# Patient Record
Sex: MALE | Race: WHITE | NOT HISPANIC OR LATINO | URBAN - METROPOLITAN AREA
[De-identification: names, ages, dates, MRNs, and addresses within clinical notes are randomized per-mention and may not be internally consistent; named-entity substitution may affect disease eponyms.]

---

## 2017-01-04 ENCOUNTER — FOLLOW UP ESTABLISHED (OUTPATIENT)
Dept: URBAN - METROPOLITAN AREA CLINIC 24 | Facility: CLINIC | Age: 72
End: 2017-01-04
Payer: COMMERCIAL

## 2017-01-04 DIAGNOSIS — Z96.1 PRESENCE OF INTRAOCULAR LENS: ICD-10-CM

## 2017-01-04 DIAGNOSIS — H25.12 AGE-RELATED NUCLEAR CATARACT, LEFT EYE: ICD-10-CM

## 2017-01-04 PROCEDURE — 92012 INTRM OPH EXAM EST PATIENT: CPT | Performed by: OPTOMETRIST

## 2017-01-04 PROCEDURE — 92133 CPTRZD OPH DX IMG PST SGM ON: CPT | Performed by: OPTOMETRIST

## 2017-01-04 ASSESSMENT — INTRAOCULAR PRESSURE
OS: 20
OD: 27
OD: 32

## 2017-01-25 ENCOUNTER — FOLLOW UP ESTABLISHED (OUTPATIENT)
Dept: URBAN - METROPOLITAN AREA CLINIC 24 | Facility: CLINIC | Age: 72
End: 2017-01-25
Payer: COMMERCIAL

## 2017-01-25 DIAGNOSIS — H04.123 DRY EYE SYNDROME OF BILATERAL LACRIMAL GLANDS: ICD-10-CM

## 2017-01-25 PROCEDURE — 92012 INTRM OPH EXAM EST PATIENT: CPT | Performed by: OPTOMETRIST

## 2017-01-25 RX ORDER — BRINZOLAMIDE/BRIMONIDINE TARTRATE 10; 2 MG/ML; MG/ML
SUSPENSION/ DROPS OPHTHALMIC
Qty: 3 | Refills: 3 | Status: ACTIVE
Start: 2017-01-25

## 2017-01-25 RX ORDER — BRINZOLAMIDE/BRIMONIDINE TARTRATE 10; 2 MG/ML; MG/ML
SUSPENSION/ DROPS OPHTHALMIC
Qty: 0 | Refills: 0 | Status: INACTIVE
Start: 2017-01-04 | End: 2017-01-25

## 2017-01-25 ASSESSMENT — INTRAOCULAR PRESSURE
OS: 18
OD: 26
OS: 21

## 2017-05-31 ENCOUNTER — FOLLOW UP ESTABLISHED (OUTPATIENT)
Dept: URBAN - METROPOLITAN AREA CLINIC 24 | Facility: CLINIC | Age: 72
End: 2017-05-31
Payer: COMMERCIAL

## 2017-05-31 DIAGNOSIS — H40.053 OCULAR HYPERTENSION, BILATERAL: Primary | ICD-10-CM

## 2017-05-31 PROCEDURE — 92020 GONIOSCOPY: CPT | Performed by: OPHTHALMOLOGY

## 2017-05-31 PROCEDURE — 92014 COMPRE OPH EXAM EST PT 1/>: CPT | Performed by: OPHTHALMOLOGY

## 2017-05-31 ASSESSMENT — INTRAOCULAR PRESSURE
OD: 20
OS: 20

## 2022-11-17 ENCOUNTER — OFFICE VISIT (OUTPATIENT)
Dept: URBAN - METROPOLITAN AREA CLINIC 24 | Facility: CLINIC | Age: 77
End: 2022-11-17
Payer: COMMERCIAL

## 2022-11-17 DIAGNOSIS — H25.12 AGE-RELATED NUCLEAR CATARACT, LEFT EYE: ICD-10-CM

## 2022-11-17 DIAGNOSIS — H26.491 OTHER SECONDARY CATARACT, RIGHT EYE: ICD-10-CM

## 2022-11-17 DIAGNOSIS — Z96.1 PRESENCE OF INTRAOCULAR LENS: Primary | ICD-10-CM

## 2022-11-17 DIAGNOSIS — H40.1113 PRIMARY OPEN-ANGLE GLAUCOMA, SEVERE STAGE, RIGHT EYE: ICD-10-CM

## 2022-11-17 PROCEDURE — 92083 EXTENDED VISUAL FIELD XM: CPT | Performed by: OPTOMETRIST

## 2022-11-17 PROCEDURE — 99204 OFFICE O/P NEW MOD 45 MIN: CPT | Performed by: OPTOMETRIST

## 2022-11-17 PROCEDURE — 92133 CPTRZD OPH DX IMG PST SGM ON: CPT | Performed by: OPTOMETRIST

## 2022-11-17 RX ORDER — BRINZOLAMIDE/BRIMONIDINE TARTRATE 10; 2 MG/ML; MG/ML
SUSPENSION/ DROPS OPHTHALMIC
Qty: 7.5 | Refills: 3 | Status: ACTIVE
Start: 2022-11-17

## 2022-11-17 RX ORDER — LATANOPROST 50 UG/ML
0.005 % SOLUTION OPHTHALMIC
Qty: 7.5 | Refills: 3 | Status: ACTIVE
Start: 2022-11-17

## 2022-11-17 ASSESSMENT — INTRAOCULAR PRESSURE
OD: 39
OD: 31
OS: 22
OS: 17

## 2022-11-17 ASSESSMENT — VISUAL ACUITY
OD: 20/15
OS: 20/20

## 2022-11-17 ASSESSMENT — KERATOMETRY
OS: 41.46
OD: 41.41

## 2022-11-17 NOTE — IMPRESSION/PLAN
Impression: Primary open-angle glaucoma, severe stage, right eye: H40.1113. Plan: SEVERE LOSS OD from previous (pt reports seeing outside OD for last several years. Restart:
1. Simbrinza TID OD 2. Latanoprost QHS OD See 2-3 weeks for IOP check.

## 2022-11-17 NOTE — IMPRESSION/PLAN
Impression: Other secondary cataract, right eye: H26.491. Plan: Opacified capsule not affecting vision. No indication for treatment. Return if decreased vision. Pt advised THIS IS NOT the etiology of blurred vision. GLAUCOMA is limiting OD.

## 2022-11-17 NOTE — IMPRESSION/PLAN
Impression: Age-realated nuclear cataract, left eye Plan: No treatment currently recommended due to level of vision. Patient will monitor vision changes and contact us with any decrease in vision, will re-evaluate cataract on return visit.

## 2022-12-05 ENCOUNTER — OFFICE VISIT (OUTPATIENT)
Dept: URBAN - METROPOLITAN AREA CLINIC 24 | Facility: CLINIC | Age: 77
End: 2022-12-05
Payer: COMMERCIAL

## 2022-12-05 DIAGNOSIS — H40.1113 PRIMARY OPEN-ANGLE GLAUCOMA, RIGHT EYE, SEVERE STAGE: Primary | ICD-10-CM

## 2022-12-05 PROCEDURE — 76514 ECHO EXAM OF EYE THICKNESS: CPT | Performed by: OPTOMETRIST

## 2022-12-05 PROCEDURE — 92250 FUNDUS PHOTOGRAPHY W/I&R: CPT | Performed by: OPTOMETRIST

## 2022-12-05 PROCEDURE — 99214 OFFICE O/P EST MOD 30 MIN: CPT | Performed by: OPTOMETRIST

## 2022-12-05 RX ORDER — DORZOLAMIDE HYDROCHLORIDE AND TIMOLOL MALEATE 20; 5 MG/ML; MG/ML
SOLUTION/ DROPS OPHTHALMIC
Qty: 10 | Refills: 3 | Status: ACTIVE
Start: 2022-12-05

## 2022-12-05 RX ORDER — BRIMONIDINE TARTRATE 2 MG/ML
0.2 % SOLUTION/ DROPS OPHTHALMIC
Qty: 15 | Refills: 3 | Status: ACTIVE
Start: 2022-12-05

## 2022-12-05 ASSESSMENT — INTRAOCULAR PRESSURE
OS: 20
OD: 31

## 2022-12-05 NOTE — IMPRESSION/PLAN
Impression: Primary open-angle glaucoma, severe stage, right eye: H40.1113. --Tmax OD: 39, OS: 22 
--Gonio OD=SS TO CBB OS=SS per [facility]
--Pachs OD: 547, OS: 557
--(-) fohx glc
--(-) sulfa allergy
--(-) pmhx lung disease or heart dz
--Target IOP: mid teens or less Plan: SEVERE LOSS OD from previous. No BDP care May '17---->Nov '22 (pt reports seeing outside OD for last several years). MASSIVE progression of disease from previous. Extensive pt edu. [place], OCTs previously demonstrated; pt advised loss is irreversible. Risk of blindness significant. MUST comply with ongoing gtts and tx!! IOP today 31 / 20 Continue: 1. Latanoprost QHS OD Stop simbrinza (pt request due to cost) Start:
2. Cosopt BID OD 3. Brimonidine TID OD Considering limited IOP improvement w/ simbrinza + latanoprost; schedule f/u with glc team (next avail).   Discussed potential need for additional tx including SLT, MIGs, Sx.

## 2023-02-28 ENCOUNTER — OFFICE VISIT (OUTPATIENT)
Dept: URBAN - METROPOLITAN AREA CLINIC 24 | Facility: CLINIC | Age: 78
End: 2023-02-28
Payer: COMMERCIAL

## 2023-02-28 DIAGNOSIS — H40.053 OCULAR HYPERTENSION, BILATERAL: Primary | ICD-10-CM

## 2023-02-28 PROCEDURE — 92020 GONIOSCOPY: CPT | Performed by: OPHTHALMOLOGY

## 2023-02-28 PROCEDURE — 99203 OFFICE O/P NEW LOW 30 MIN: CPT | Performed by: OPHTHALMOLOGY

## 2023-02-28 ASSESSMENT — INTRAOCULAR PRESSURE
OS: 27
OD: 28

## 2023-02-28 NOTE — IMPRESSION/PLAN
Impression: Ocular hypertension, bilateral Plan: PT HAS POAG OU      GONIO OD-SS,  OS=SS (2/28/23)                  PACHS 564//578 THE RIGHT EYE IS PSEUDOPHAKIC, THE LEFT EYE IS PHAKIC S/P PC IOL OD 2012 PT DENIES SULFA ALLERGY
PT DENIES LUNG DZ
TARGET IOP MID TEENS OR LESS
RECOMMEND 1. USE  LATANOPROST OU QPM (STARTED BY DR SANCHEZ)(ADD TO OS 2/28/23) 2. USE COSOPT OU BID (ADD TO OS 2/28/23) 3. USE BRIMONIDINE OU TID (ADD TO OS 2/28/23) 4. VISUAL FIELD 11/17/2022, RNFL 11/17/2022 5. VF ON 11/17/22 SHOWED DRAMATIC GLAUCOMATOUS PROGRESSION COMPARED TO [phone] WHEN HE WAS CARED FOR OUTSIDE [facility] (I HAD NOT SEEN PATIENT FROM 5/31/17 UNTIL TODAY 2/28/23) 6.  CONSULT DR Coni Frost FOR SURGICAL TREATMENT ESPECIALLY OD WITH VF AT THAT TIME

## 2023-05-03 ENCOUNTER — OFFICE VISIT (OUTPATIENT)
Dept: URBAN - METROPOLITAN AREA CLINIC 24 | Facility: CLINIC | Age: 78
End: 2023-05-03
Payer: COMMERCIAL

## 2023-05-03 DIAGNOSIS — H40.1113 PRIMARY OPEN-ANGLE GLAUCOMA, RIGHT EYE, SEVERE STAGE: ICD-10-CM

## 2023-05-03 DIAGNOSIS — H40.052 OCULAR HYPERTENSION, LEFT EYE: ICD-10-CM

## 2023-05-03 DIAGNOSIS — H02.052 TRICHIASIS WITHOUT ENTROPION RIGHT LOWER EYELID: Primary | ICD-10-CM

## 2023-05-03 PROCEDURE — 99214 OFFICE O/P EST MOD 30 MIN: CPT | Performed by: OPHTHALMOLOGY

## 2023-05-03 PROCEDURE — 92020 GONIOSCOPY: CPT | Performed by: OPHTHALMOLOGY

## 2023-05-03 PROCEDURE — 67820 REVISE EYELASHES: CPT | Performed by: OPHTHALMOLOGY

## 2023-05-03 RX ORDER — LATANOPROST 50 UG/ML
0.005 % SOLUTION OPHTHALMIC
Qty: 15 | Refills: 3 | Status: ACTIVE
Start: 2023-05-03

## 2023-05-03 RX ORDER — LATANOPROST 50 UG/ML
0.005 % SOLUTION OPHTHALMIC
Qty: 15 | Refills: 3 | Status: INACTIVE
Start: 2023-05-03 | End: 2023-05-03

## 2023-05-03 RX ORDER — DORZOLAMIDE HYDROCHLORIDE AND TIMOLOL MALEATE 20; 5 MG/ML; MG/ML
SOLUTION/ DROPS OPHTHALMIC
Qty: 10 | Refills: 3 | Status: INACTIVE
Start: 2023-05-03 | End: 2023-05-03

## 2023-05-03 RX ORDER — DORZOLAMIDE HYDROCHLORIDE AND TIMOLOL MALEATE 20; 5 MG/ML; MG/ML
SOLUTION/ DROPS OPHTHALMIC
Qty: 10 | Refills: 3 | Status: ACTIVE
Start: 2023-05-03

## 2023-05-03 ASSESSMENT — INTRAOCULAR PRESSURE
OD: 17
OS: 17

## 2023-05-03 NOTE — IMPRESSION/PLAN
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 Plan: Bilateral lower lid with lashes rubbing K Epilation performed OU without issue.

## 2023-05-03 NOTE — IMPRESSION/PLAN
Impression: Primary open-angle glaucoma, right eye, severe stage: H40.1113. Plan: PT HAS POAG OU      GONIO OD-SS,  OS=SS (2/28/23)     IOP: 17 OU    PACHS 564//578 THE RIGHT EYE IS PSEUDOPHAKIC, THE LEFT EYE IS PHAKIC S/P PC IOL OD 2012 TARGET: MID TO LOW TEENS
PT DENIES SULFA ALLERGY
PT DENIES LUNG DZ
TARGET IOP MID TEENS OR LESS TESTING: 
VF- OD FULL LOSS ; OS STABLE
RECOMMEND 1. USE  LATANOPROST OU QPM, COSOPT BID OU, BRIMONIDINE TID OU. 2. VF ON 11/17/22 SHOWED DRAMATIC GLAUCOMATOUS PROGRESSION COMPARED TO [phone] WHEN HE WAS CARED FOR OUTSIDE [facility] (I HAD NOT SEEN PATIENT FROM 5/31/17 UNTIL TODAY 2/28/23) 3. WILL CONT TO MONITOR. WILL KEEP ON CURRENT DROP REGIMEN 4. RETURN 4-6 WEEKS FOR IOP WITH OPTOM - If IOP still too high OD then return to Dr. [place] for surgical intervention

## 2023-06-14 ENCOUNTER — OFFICE VISIT (OUTPATIENT)
Dept: URBAN - METROPOLITAN AREA CLINIC 24 | Facility: CLINIC | Age: 78
End: 2023-06-14
Payer: COMMERCIAL

## 2023-06-14 DIAGNOSIS — H40.1113 PRIMARY OPEN-ANGLE GLAUCOMA, RIGHT EYE, SEVERE STAGE: Primary | ICD-10-CM

## 2023-06-14 PROCEDURE — 92012 INTRM OPH EXAM EST PATIENT: CPT | Performed by: OPTOMETRIST

## 2023-06-14 RX ORDER — LATANOPROST 50 UG/ML
0.005 % SOLUTION OPHTHALMIC
Qty: 7.5 | Refills: 3 | Status: ACTIVE
Start: 2023-06-14

## 2023-06-14 RX ORDER — BRIMONIDINE TARTRATE 2 MG/ML
0.2 % SOLUTION/ DROPS OPHTHALMIC
Qty: 15 | Refills: 3 | Status: ACTIVE
Start: 2023-06-14

## 2023-06-14 RX ORDER — DORZOLAMIDE HYDROCHLORIDE AND TIMOLOL MALEATE 20; 5 MG/ML; MG/ML
SOLUTION/ DROPS OPHTHALMIC
Qty: 15 | Refills: 3 | Status: ACTIVE
Start: 2023-06-14

## 2023-06-14 ASSESSMENT — INTRAOCULAR PRESSURE
OD: 22
OD: 20
OS: 22

## 2023-06-14 ASSESSMENT — KERATOMETRY
OD: 41.59
OS: 41.47

## 2023-06-14 NOTE — IMPRESSION/PLAN
Impression: Primary open-angle glaucoma, right eye, severe stage: H40.1113. Plan: IOP elevated today despite compliance with gtts. Per Dr. Alberto Campbell previous note: Pt to RTC for surgical consultation. Will schedule within the next 2 weeks. Continue  LATANOPROST OU QPM, COSOPT BID OU, BRIMONIDINE TID OU.

## 2023-06-28 ENCOUNTER — OFFICE VISIT (OUTPATIENT)
Dept: URBAN - METROPOLITAN AREA CLINIC 24 | Facility: CLINIC | Age: 78
End: 2023-06-28
Payer: COMMERCIAL

## 2023-06-28 DIAGNOSIS — H40.052 OCULAR HYPERTENSION, LEFT EYE: ICD-10-CM

## 2023-06-28 DIAGNOSIS — H40.1113 PRIMARY OPEN-ANGLE GLAUCOMA, RIGHT EYE, SEVERE STAGE: Primary | ICD-10-CM

## 2023-06-28 DIAGNOSIS — H25.12 AGE-RELATED NUCLEAR CATARACT, LEFT EYE: ICD-10-CM

## 2023-06-28 PROCEDURE — 99214 OFFICE O/P EST MOD 30 MIN: CPT | Performed by: OPHTHALMOLOGY

## 2023-06-28 RX ORDER — OFLOXACIN 3 MG/ML
0.3 % SOLUTION/ DROPS OPHTHALMIC
Qty: 5 | Refills: 1 | Status: ACTIVE
Start: 2023-06-28

## 2023-06-28 RX ORDER — PREDNISOLONE ACETATE 10 MG/ML
1 % SUSPENSION/ DROPS OPHTHALMIC
Qty: 5 | Refills: 1 | Status: ACTIVE
Start: 2023-06-28

## 2023-06-28 ASSESSMENT — INTRAOCULAR PRESSURE
OS: 10
OD: 17

## 2023-06-28 NOTE — IMPRESSION/PLAN
Impression: Primary open-angle glaucoma, right eye, severe stage: H40.1113. Plan: Pt has Glaucoma    Gonio :   ss o u     Pachs:  547/557    Today's IOP :  17, 10       Tmax  :  39, 27 Target IOP low teens Pt denies Fhx of Glaucoma Equal [facility] OU
HVF 24-2 (5/2023) OD:  global depression OS: full C/D: .95-.95 / .2-.2 OCT: (11/17/22) 60 ; STI thinning // 77 ; wnl Pt denies Sulfa Allergy   // Pt denies Lung /Heart dx Plan :
1. Cont:
Latanoprost OU QPM
Cosopt BID OU Brimonidine TID OU
2. IOP OD is still too elevated for the amount of Glaucoma incurred. Recommend additional surgery to lower IOP. 3.  The Patient is aware that the risks of Xen include but are not limited to complete blindness , loss of vision and loss of the eye, bleeding, infection, inflammation, Hypotony, Persistent IOP elevation, intractable diplopia , and orbital or ocular inflammation. The Patient is aware that there is a 10 % chance of permanent double vision which cannot be remedied by any means including glasses or strabismus surgery. The patient is aware that failure to lower IOP will likely lead to progressive sight loss and possibly blindness . Pt understands and wishes to proceed. *20 minutes/points **PCC's Please notify OR to order device and Mitomycin 4% (allow 1 week for mitomycin to be ordered) Pred Q2H  x 3 days, then QID x 6 weeks then decrease by drop  every 2 weeks 4.  Schedule XEN OD

## 2023-07-25 ENCOUNTER — ADULT PHYSICAL (OUTPATIENT)
Dept: URBAN - METROPOLITAN AREA CLINIC 24 | Facility: CLINIC | Age: 78
End: 2023-07-25
Payer: COMMERCIAL

## 2023-07-25 DIAGNOSIS — Z01.818 ENCOUNTER FOR OTHER PREPROCEDURAL EXAMINATION: Primary | ICD-10-CM

## 2023-07-25 DIAGNOSIS — H40.1113 PRIMARY OPEN-ANGLE GLAUCOMA, RIGHT EYE, SEVERE STAGE: ICD-10-CM

## 2023-07-25 PROCEDURE — 99202 OFFICE O/P NEW SF 15 MIN: CPT | Performed by: PHYSICIAN ASSISTANT

## 2024-03-20 ENCOUNTER — OFFICE VISIT (OUTPATIENT)
Dept: URBAN - METROPOLITAN AREA CLINIC 24 | Facility: CLINIC | Age: 79
End: 2024-03-20
Payer: COMMERCIAL

## 2024-03-20 DIAGNOSIS — H40.052 OCULAR HYPERTENSION, LEFT EYE: ICD-10-CM

## 2024-03-20 DIAGNOSIS — H40.1113 PRIMARY OPEN-ANGLE GLAUCOMA, RIGHT EYE, SEVERE STAGE: Primary | ICD-10-CM

## 2024-03-20 PROCEDURE — 99214 OFFICE O/P EST MOD 30 MIN: CPT | Performed by: OPHTHALMOLOGY

## 2024-03-20 PROCEDURE — 92133 CPTRZD OPH DX IMG PST SGM ON: CPT | Performed by: OPHTHALMOLOGY

## 2024-03-20 PROCEDURE — 92083 EXTENDED VISUAL FIELD XM: CPT | Performed by: OPHTHALMOLOGY

## 2024-03-20 RX ORDER — LATANOPROST 50 UG/ML
0.005 % SOLUTION OPHTHALMIC
Qty: 7.5 | Refills: 3 | Status: ACTIVE
Start: 2024-03-20

## 2024-03-20 RX ORDER — BRIMONIDINE TARTRATE 2 MG/ML
0.2 % SOLUTION/ DROPS OPHTHALMIC
Qty: 15 | Refills: 3 | Status: ACTIVE
Start: 2024-03-20

## 2024-03-20 RX ORDER — DORZOLAMIDE HYDROCHLORIDE AND TIMOLOL MALEATE 20; 5 MG/ML; MG/ML
SOLUTION/ DROPS OPHTHALMIC
Qty: 10 | Refills: 0 | Status: ACTIVE
Start: 2024-03-20

## 2024-03-20 ASSESSMENT — INTRAOCULAR PRESSURE
OD: 30
OS: 18

## 2024-03-20 ASSESSMENT — KERATOMETRY
OD: 41.55
OS: 41.41

## 2024-08-07 ENCOUNTER — OFFICE VISIT (OUTPATIENT)
Dept: URBAN - METROPOLITAN AREA CLINIC 23 | Facility: CLINIC | Age: 79
End: 2024-08-07
Payer: COMMERCIAL

## 2024-08-07 DIAGNOSIS — H02.055 TRICHIASIS WITHOUT ENTROPION LEFT LOWER EYELID: ICD-10-CM

## 2024-08-07 DIAGNOSIS — H40.1134 PRIMARY OPEN-ANGLE GLAUCOMA, BILATERAL, INDETERMINATE STAGE: Primary | ICD-10-CM

## 2024-08-07 PROCEDURE — 76514 ECHO EXAM OF EYE THICKNESS: CPT | Performed by: OPHTHALMOLOGY

## 2024-08-07 PROCEDURE — 92004 COMPRE OPH EXAM NEW PT 1/>: CPT | Performed by: OPHTHALMOLOGY

## 2024-08-07 PROCEDURE — 92020 GONIOSCOPY: CPT | Performed by: OPHTHALMOLOGY

## 2024-08-07 PROCEDURE — 92133 CPTRZD OPH DX IMG PST SGM ON: CPT | Performed by: OPHTHALMOLOGY

## 2024-08-07 RX ORDER — DORZOLAMIDE HYDROCHLORIDE AND TIMOLOL MALEATE 20; 5 MG/ML; MG/ML
SOLUTION/ DROPS OPHTHALMIC
Qty: 10 | Refills: 0 | Status: ACTIVE
Start: 2024-08-07

## 2024-08-07 RX ORDER — BRIMONIDINE TARTRATE 2 MG/ML
0.2 % SOLUTION/ DROPS OPHTHALMIC
Qty: 15 | Refills: 3 | Status: ACTIVE
Start: 2024-08-07

## 2024-08-07 RX ORDER — LATANOPROST 50 UG/ML
0.005 % SOLUTION OPHTHALMIC
Qty: 7.5 | Refills: 3 | Status: ACTIVE
Start: 2024-08-07

## 2024-08-07 ASSESSMENT — INTRAOCULAR PRESSURE
OS: 15
OD: 24

## 2024-09-12 ENCOUNTER — POST-OPERATIVE VISIT (OUTPATIENT)
Dept: URBAN - METROPOLITAN AREA CLINIC 23 | Facility: CLINIC | Age: 79
End: 2024-09-12
Payer: COMMERCIAL

## 2024-09-12 DIAGNOSIS — H40.1134 PRIMARY OPEN-ANGLE GLAUCOMA, BILATERAL, INDETERMINATE STAGE: Primary | ICD-10-CM

## 2024-09-12 PROCEDURE — 99213 OFFICE O/P EST LOW 20 MIN: CPT | Performed by: OPHTHALMOLOGY

## 2024-09-12 RX ORDER — BRIMONIDINE TARTRATE 2 MG/ML
0.2 % SOLUTION/ DROPS OPHTHALMIC
Qty: 15 | Refills: 3 | Status: ACTIVE
Start: 2024-09-12

## 2024-09-12 RX ORDER — LATANOPROST 50 UG/ML
0.005 % SOLUTION OPHTHALMIC
Qty: 7.5 | Refills: 3 | Status: ACTIVE
Start: 2024-09-12

## 2024-09-12 RX ORDER — DORZOLAMIDE HYDROCHLORIDE AND TIMOLOL MALEATE 20; 5 MG/ML; MG/ML
SOLUTION/ DROPS OPHTHALMIC
Qty: 10 | Refills: 0 | Status: ACTIVE
Start: 2024-09-12

## 2024-09-12 ASSESSMENT — INTRAOCULAR PRESSURE
OD: 21
OS: 18

## 2024-11-07 ENCOUNTER — OFFICE VISIT (OUTPATIENT)
Dept: URBAN - METROPOLITAN AREA CLINIC 23 | Facility: CLINIC | Age: 79
End: 2024-11-07
Payer: COMMERCIAL

## 2024-11-07 DIAGNOSIS — H40.1134 PRIMARY OPEN-ANGLE GLAUCOMA, BILATERAL, INDETERMINATE STAGE: Primary | ICD-10-CM

## 2024-11-07 DIAGNOSIS — H25.12 AGE-RELATED NUCLEAR CATARACT, LEFT EYE: ICD-10-CM

## 2024-11-07 PROCEDURE — 99213 OFFICE O/P EST LOW 20 MIN: CPT | Performed by: OPHTHALMOLOGY

## 2024-11-07 ASSESSMENT — INTRAOCULAR PRESSURE
OD: 20
OS: 18

## 2025-05-19 ENCOUNTER — OFFICE VISIT (OUTPATIENT)
Dept: URBAN - METROPOLITAN AREA CLINIC 23 | Facility: CLINIC | Age: 80
End: 2025-05-19
Payer: COMMERCIAL

## 2025-05-19 DIAGNOSIS — H40.1134 PRIMARY OPEN-ANGLE GLAUCOMA, BILATERAL, INDETERMINATE STAGE: Primary | ICD-10-CM

## 2025-05-19 PROCEDURE — 92083 EXTENDED VISUAL FIELD XM: CPT | Performed by: OPHTHALMOLOGY

## 2025-05-19 PROCEDURE — 99214 OFFICE O/P EST MOD 30 MIN: CPT | Performed by: OPHTHALMOLOGY

## 2025-05-19 ASSESSMENT — KERATOMETRY
OS: 41.88
OD: 41.63

## 2025-05-19 ASSESSMENT — INTRAOCULAR PRESSURE
OD: 23
OS: 19

## 2025-08-06 ENCOUNTER — OFFICE VISIT (OUTPATIENT)
Dept: URBAN - METROPOLITAN AREA CLINIC 23 | Facility: CLINIC | Age: 80
End: 2025-08-06
Payer: COMMERCIAL

## 2025-08-06 DIAGNOSIS — H40.1113 PRIMARY OPEN-ANGLE GLAUCOMA, RIGHT EYE, SEVERE STAGE: Primary | ICD-10-CM

## 2025-08-06 DIAGNOSIS — H25.12 AGE-RELATED NUCLEAR CATARACT, LEFT EYE: ICD-10-CM

## 2025-08-06 DIAGNOSIS — H40.012 OPEN ANGLE WITH BORDERLINE FINDINGS, LOW RISK, LEFT EYE: ICD-10-CM

## 2025-08-06 PROCEDURE — 99213 OFFICE O/P EST LOW 20 MIN: CPT | Performed by: OPHTHALMOLOGY

## 2025-08-06 PROCEDURE — 92133 CPTRZD OPH DX IMG PST SGM ON: CPT | Performed by: OPHTHALMOLOGY

## 2025-08-06 RX ORDER — NETARSUDIL AND LATANOPROST OPHTHALMIC SOLUTION, 0.02%/0.005% .2; .05 MG/ML; MG/ML
SOLUTION/ DROPS OPHTHALMIC; TOPICAL
Qty: 7.5 | Refills: 4 | Status: ACTIVE
Start: 2025-08-06

## 2025-08-06 ASSESSMENT — INTRAOCULAR PRESSURE
OD: 20
OS: 14